# Patient Record
Sex: FEMALE
[De-identification: names, ages, dates, MRNs, and addresses within clinical notes are randomized per-mention and may not be internally consistent; named-entity substitution may affect disease eponyms.]

---

## 2021-06-25 ENCOUNTER — DASHBOARD ENCOUNTERS (OUTPATIENT)
Age: 31
End: 2021-06-25

## 2021-06-25 ENCOUNTER — OFFICE VISIT (OUTPATIENT)
Dept: URBAN - METROPOLITAN AREA CLINIC 107 | Facility: CLINIC | Age: 31
End: 2021-06-25
Payer: COMMERCIAL

## 2021-06-25 ENCOUNTER — WEB ENCOUNTER (OUTPATIENT)
Dept: URBAN - METROPOLITAN AREA CLINIC 107 | Facility: CLINIC | Age: 31
End: 2021-06-25

## 2021-06-25 VITALS
HEIGHT: 63 IN | BODY MASS INDEX: 28.88 KG/M2 | DIASTOLIC BLOOD PRESSURE: 74 MMHG | TEMPERATURE: 98.3 F | SYSTOLIC BLOOD PRESSURE: 102 MMHG | WEIGHT: 163 LBS | HEART RATE: 69 BPM

## 2021-06-25 DIAGNOSIS — K59.09 OTHER CONSTIPATION: ICD-10-CM

## 2021-06-25 DIAGNOSIS — R14.0 ABDOMINAL BLOATING: ICD-10-CM

## 2021-06-25 DIAGNOSIS — R10.12 LEFT UPPER QUADRANT ABDOMINAL PAIN: ICD-10-CM

## 2021-06-25 PROBLEM — 116289008: Status: ACTIVE | Noted: 2021-06-25

## 2021-06-25 PROBLEM — 14760008: Status: ACTIVE | Noted: 2021-06-25

## 2021-06-25 PROBLEM — 301715003: Status: ACTIVE | Noted: 2021-06-25

## 2021-06-25 PROCEDURE — 99204 OFFICE O/P NEW MOD 45 MIN: CPT | Performed by: INTERNAL MEDICINE

## 2021-06-25 RX ORDER — ALBUTEROL SULFATE 90 UG/1
1 PUFF AS NEEDED AEROSOL, METERED RESPIRATORY (INHALATION)
Status: ACTIVE | COMMUNITY

## 2021-06-25 RX ORDER — DICYCLOMINE HYDROCHLORIDE 10 MG/1
1 TABLET CAPSULE ORAL
Qty: 60 | Refills: 3 | OUTPATIENT
Start: 2021-06-25 | End: 2021-10-23

## 2021-06-25 RX ORDER — MULTIVIT-MIN/IRON/FOLIC ACID/K 18-600-40
AS DIRECTED CAPSULE ORAL
Status: ACTIVE | COMMUNITY

## 2021-06-25 NOTE — HPI-TODAY'S VISIT:
This is a 30-year-old female presenting for evaluation of abdominal pain. Labs 5/12/2021:CBC: WBC 4.9, hemoglobin 12.8, MCV 86, platelet 239.  BMP normal with exception of creatinine 1.01.  LFTs normal TB 0.4, ALP 56, ALT 8, AST 20.  A1c 5.4.  TSH 0.299.  Cholesterol panel normal with the exception of HDL 33. She reports onset of symptoms on 5/18/2021.  She  experienced a decrease in stool frequency.  She reports 2 weeks without a bowel movement.  She began to experience pressure in the left upper quadrant and into the left lower quadrant.  It persisted for 2 days escalating to a "twisting or nodding pain" radiating to her back.  She tried over-the-counter laxatives without improvement.  Pain persisted and settled in her lower abdomen later becoming generalized.  She had persistent "twisting" pain.  She was evaluated at urgent care on 5/28 and diagnosed with bacterial vaginosis for which she was prescribed metronidazole.  She went to the emergency department at [facility] on 5/29.  Labs were performed.  Pregnancy was ruled out.  An ultrasound was performed.  She later went to [facility] emergency department.  She had a CT scan that demonstrated fecal matter and a cyst on her ovary.  She had follow-up with her GYN who was unconcerned regarding the cyst.  Her primary care physician recommended stool softeners.  These have been helpful but she feels as though she is reliant on them in order to have a bowel movement.  She has persistent pain indicated in the left lower quadrant that she describes as not as sharp or feeling like a baby moving."  This is exacerbated by eating and there is no improvement after bowel movements.  She reports intermittent mucus in her stool.  She denies red blood per rectum or melena.  She reports persistent bloating.  She denies any other abdominal symptoms.  She denies a family history of colon cancer and denies NSAID use.  At baseline, she has a bowel movement every day.  She admits hard stools with intermittent straining.

## 2021-06-29 ENCOUNTER — OFFICE VISIT (OUTPATIENT)
Dept: URBAN - METROPOLITAN AREA CLINIC 107 | Facility: CLINIC | Age: 31
End: 2021-06-29

## 2021-08-27 ENCOUNTER — OFFICE VISIT (OUTPATIENT)
Dept: URBAN - METROPOLITAN AREA CLINIC 107 | Facility: CLINIC | Age: 31
End: 2021-08-27